# Patient Record
Sex: FEMALE | Race: WHITE | NOT HISPANIC OR LATINO | Employment: FULL TIME | URBAN - NONMETROPOLITAN AREA
[De-identification: names, ages, dates, MRNs, and addresses within clinical notes are randomized per-mention and may not be internally consistent; named-entity substitution may affect disease eponyms.]

---

## 2018-01-20 RX ORDER — MECLIZINE HYDROCHLORIDE 25 MG/1
25 TABLET ORAL 3 TIMES DAILY PRN
Qty: 30 TABLET | Refills: 2 | Status: SHIPPED | OUTPATIENT
Start: 2018-01-20 | End: 2018-05-08

## 2018-05-08 ENCOUNTER — OFFICE VISIT (OUTPATIENT)
Dept: OBSTETRICS AND GYNECOLOGY | Facility: CLINIC | Age: 48
End: 2018-05-08

## 2018-05-08 VITALS
HEIGHT: 66 IN | BODY MASS INDEX: 43.07 KG/M2 | SYSTOLIC BLOOD PRESSURE: 149 MMHG | WEIGHT: 268 LBS | DIASTOLIC BLOOD PRESSURE: 80 MMHG

## 2018-05-08 DIAGNOSIS — E66.01 MORBID OBESITY WITH BMI OF 40.0-44.9, ADULT (HCC): Chronic | ICD-10-CM

## 2018-05-08 DIAGNOSIS — R53.83 LETHARGY: ICD-10-CM

## 2018-05-08 DIAGNOSIS — R03.0 ELEVATED SYSTOLIC BLOOD PRESSURE READING WITHOUT DIAGNOSIS OF HYPERTENSION: ICD-10-CM

## 2018-05-08 DIAGNOSIS — F34.1 DYSTHYMIA: Chronic | ICD-10-CM

## 2018-05-08 DIAGNOSIS — N95.1 MENOPAUSAL SYNDROME: Primary | Chronic | ICD-10-CM

## 2018-05-08 PROCEDURE — 99203 OFFICE O/P NEW LOW 30 MIN: CPT | Performed by: OBSTETRICS & GYNECOLOGY

## 2018-05-08 RX ORDER — BUPROPION HYDROCHLORIDE 150 MG/1
150 TABLET, EXTENDED RELEASE ORAL 2 TIMES DAILY
Qty: 60 TABLET | Refills: 11 | Status: SHIPPED | OUTPATIENT
Start: 2018-05-08 | End: 2021-04-01

## 2018-05-08 RX ORDER — PHENTERMINE HYDROCHLORIDE 30 MG/1
30 CAPSULE ORAL EVERY MORNING
Qty: 60 CAPSULE | Refills: 3 | Status: SHIPPED | OUTPATIENT
Start: 2018-05-08 | End: 2021-04-01

## 2018-05-09 NOTE — PROGRESS NOTES
Jaja Land is a 48 y.o. y/o female     Chief Complaint: Lethargy, dysthymia, and weight gain    HPI: 48-year-old  0022 prior vaginal deliveries and hysterectomy with BSO in .    She underwent total abdominal hysterectomy and bilateral salpingo-oophorectomy around  for symptomatic fibroid uterus.  She used an estradiol patch for a short period of time, but is no longer using any estrogen replacement therapy and is no longer have any hot flashes.    Past medical history significant for obesity.    Past surgical history includes KARLA/BSO for fibroid uterus in .    OB history one vaginal delivery at 36 weeks and 2 days and second vaginal delivery at 40 weeks.    She is not currently taking any medications.    No known drug allergies.    She is .    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    She does not smoke or use smokeless tobacco.  She quit smoking in .    Her mother  at age 65 of a glioblastoma.  Her father is age 76.  She has siblings ages 51, 53, and 55.  She has 2 healthy children ages 22 and 23.  There is no family history of breast cancer, uterine cancer, ovarian cancer, or colon cancer.    Several years ago she took phentermine and had some success with weight loss but she quit taking it.  She checks her blood pressure with a large blood pressure cuff and that is been normal.  We discussed checking some fasting lab work and trying Wellbutrin  mg twice a day.  She is interested in trying this.  Also prescribed her phentermine 30 mg capsule to start in a couple weeks if she needs extra help with appetite suppression.  We discussed potential side effects with both the Wellbutrin and phentermine.  She does not have any chest pain or heart palpitations.    Review of Systems   Constitutional: Positive for fatigue. Negative for activity change, appetite change, chills, diaphoresis, fever and unexpected weight change.   Gastrointestinal: Negative for  abdominal pain, constipation, diarrhea and nausea.   Genitourinary: Negative for difficulty urinating, dyspareunia, dysuria, pelvic pain, urgency, vaginal bleeding, vaginal discharge and vaginal pain.   Neurological: Negative for headaches.   Psychiatric/Behavioral: Positive for dysphoric mood. The patient is not nervous/anxious.    All other systems reviewed and are negative.     Breast ROS: negative    The following portions of the patient's history were reviewed and updated as appropriate: allergies, current medications, past family history, past medical history, past social history, past surgical history and problem list.    No Known Allergies       Current Outpatient Prescriptions:   •  buPROPion SR (WELLBUTRIN SR) 150 MG 12 hr tablet, Take 1 tablet by mouth 2 (Two) Times a Day., Disp: 60 tablet, Rfl: 11  •  phentermine 30 MG capsule, Take 1 capsule by mouth Every Morning., Disp: 60 capsule, Rfl: 3     The patient has a family history of   Family History   Problem Relation Age of Onset   • Brain cancer Mother         Past Medical History:   Diagnosis Date   • Dysthymia 2018   • Elevated systolic blood pressure reading without diagnosis of hypertension 2018   • Fibroid    • Menopausal syndrome 2018   • Morbid obesity with BMI of 40.0-44.9, adult 2018        OB History      Para Term  AB Living    2 2 1 1      SAB TAB Ectopic Molar Multiple Live Births                    Social History     Social History   • Marital status:      Spouse name: N/A   • Number of children: N/A   • Years of education: N/A     Occupational History   • Not on file.     Social History Main Topics   • Smoking status: Former Smoker   • Smokeless tobacco: Never Used   • Alcohol use No   • Drug use: No   • Sexual activity: Not on file     Other Topics Concern   • Not on file     Social History Narrative   • No narrative on file        Past Surgical History:   Procedure Laterality Date   • HYSTERECTOMY    "  • WISDOM TOOTH EXTRACTION          Patient Active Problem List   Diagnosis   • Morbid obesity with BMI of 40.0-44.9, adult   • Menopausal syndrome   • Elevated systolic blood pressure reading without diagnosis of hypertension   • Dysthymia        Documented Vitals    05/08/18 1330   BP: 149/80   Weight: 122 kg (268 lb)   Height: 167.6 cm (66\")       Physical Exam   Constitutional: She is oriented to person, place, and time. No distress.   Obese white female weighing 268 pounds with BMI 43.3.  Blood pressure 149/80.  Pulse 76.     HENT:   Head: Normocephalic and atraumatic.   Eyes: Conjunctivae and EOM are normal. Pupils are equal, round, and reactive to light.   Neck: Normal range of motion. Neck supple. No JVD present. No tracheal deviation present. No thyromegaly present.   Cardiovascular: Normal rate, regular rhythm, normal heart sounds and intact distal pulses.  Exam reveals no gallop and no friction rub.    No murmur heard.  Pulmonary/Chest: Effort normal and breath sounds normal. No stridor. No respiratory distress. She has no wheezes. She has no rales. She exhibits no tenderness.   Abdominal: Soft. Bowel sounds are normal. She exhibits no distension and no mass. There is no tenderness. There is no rebound and no guarding. No hernia.   Musculoskeletal: Normal range of motion. She exhibits no edema, tenderness or deformity.   Lymphadenopathy:     She has no cervical adenopathy.   Neurological: She is alert and oriented to person, place, and time. She has normal reflexes. She displays normal reflexes. No cranial nerve deficit. She exhibits normal muscle tone. Coordination normal.   Skin: Skin is warm and dry. No rash noted. She is not diaphoretic. No erythema. No pallor.   Psychiatric: She has a normal mood and affect. Her behavior is normal. Judgment and thought content normal.   Nursing note and vitals reviewed.       Assessment        Diagnosis Plan   1. Menopausal syndrome     2. Dysthymia     3. Elevated " systolic blood pressure reading without diagnosis of hypertension     4. Morbid obesity with BMI of 40.0-44.9, adult     5. Lethargy           Plan      1. Check fasting lab work.  2. Wellbutrin  mg by mouth twice a day.  3. Phentermine 30 mg capsule each morning.  4. Encouraged in diet and exercise.  5. Handouts on depression, hot flashes, exercise, and vitamin use.   6. Follow-up in 6 weeks.  Follow-up sooner as needed.            This document has been electronically signed by Brandt Dacosta MD on May 8, 2018 8:02 PM

## 2019-03-22 RX ORDER — FLUCONAZOLE 150 MG/1
TABLET ORAL
Qty: 2 TABLET | Refills: 12 | Status: SHIPPED | OUTPATIENT
Start: 2019-03-22 | End: 2021-04-01

## 2019-03-22 RX ORDER — AMOXICILLIN 500 MG/1
500 CAPSULE ORAL 3 TIMES DAILY
Qty: 30 CAPSULE | Refills: 0 | Status: SHIPPED | OUTPATIENT
Start: 2019-03-22 | End: 2021-04-01

## 2020-12-21 ENCOUNTER — IMMUNIZATION (OUTPATIENT)
Dept: VACCINE CLINIC | Facility: HOSPITAL | Age: 50
End: 2020-12-21

## 2020-12-21 PROCEDURE — 91300 HC SARSCOV02 VAC 30MCG/0.3ML IM: CPT | Performed by: THORACIC SURGERY (CARDIOTHORACIC VASCULAR SURGERY)

## 2020-12-21 PROCEDURE — 0001A: CPT | Performed by: THORACIC SURGERY (CARDIOTHORACIC VASCULAR SURGERY)

## 2021-01-11 ENCOUNTER — IMMUNIZATION (OUTPATIENT)
Dept: VACCINE CLINIC | Facility: HOSPITAL | Age: 51
End: 2021-01-11

## 2021-01-11 PROCEDURE — 0002A: CPT | Performed by: THORACIC SURGERY (CARDIOTHORACIC VASCULAR SURGERY)

## 2021-01-11 PROCEDURE — 0001A: CPT | Performed by: THORACIC SURGERY (CARDIOTHORACIC VASCULAR SURGERY)

## 2021-01-11 PROCEDURE — 91300 HC SARSCOV02 VAC 30MCG/0.3ML IM: CPT | Performed by: THORACIC SURGERY (CARDIOTHORACIC VASCULAR SURGERY)

## 2021-03-29 ENCOUNTER — TRANSCRIBE ORDERS (OUTPATIENT)
Dept: PODIATRY | Facility: CLINIC | Age: 51
End: 2021-03-29

## 2021-03-29 DIAGNOSIS — M79.671 RIGHT FOOT PAIN: Primary | ICD-10-CM

## 2021-03-30 ENCOUNTER — OFFICE VISIT (OUTPATIENT)
Dept: PODIATRY | Facility: CLINIC | Age: 51
End: 2021-03-30

## 2021-03-30 VITALS — WEIGHT: 260 LBS | HEIGHT: 66 IN | BODY MASS INDEX: 41.78 KG/M2 | HEART RATE: 90 BPM | OXYGEN SATURATION: 99 %

## 2021-03-30 DIAGNOSIS — S96.911A OVERUSE SYNDROME OF FOOT, RIGHT, INITIAL ENCOUNTER: ICD-10-CM

## 2021-03-30 DIAGNOSIS — M79.671 RIGHT FOOT PAIN: Primary | ICD-10-CM
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
## 2021-03-30 DIAGNOSIS — Q66.221 METATARSUS ADDUCTUS OF RIGHT FOOT: ICD-10-CM

## 2021-03-30 DIAGNOSIS — M77.41 METATARSALGIA OF RIGHT FOOT: ICD-10-CM

## 2021-03-30 PROCEDURE — 99203 OFFICE O/P NEW LOW 30 MIN: CPT | Performed by: PODIATRIST

## 2021-03-30 RX ORDER — MELOXICAM 15 MG/1
15 TABLET ORAL DAILY
Qty: 30 TABLET | Refills: 0 | Status: SHIPPED | OUTPATIENT
Start: 2021-03-30 | End: 2022-07-22

## 2021-03-30 NOTE — PROGRESS NOTES
Jaja Land  1970  50 y.o. female     Patient presents to clinic today with the complain of right foot pain. Xray obtained today.     03/30/2021  Chief Complaint   Patient presents with   • Right Foot - Pain           History of Present Illness    Jaja Land is a 50 y.o. female who presents for evaluation of right foot pain.  Patient states that this is a chronic issue for her which is been worsening significantly over the past couple months.  She describes her pain as achy at times throbbing in sharp.  States pain is primarily located towards the outside of her foot.  Pain is worse with activity, working and hiking.  Pain is somewhat relieved with rest.  She denies any known trauma or injuries.  She has changed her shoe gear and taken NSAIDs with no significant improvement of her symptoms.      Past Medical History:   Diagnosis Date   • Dysthymia 5/8/2018   • Elevated systolic blood pressure reading without diagnosis of hypertension 5/8/2018   • Fibroid    • Menopausal syndrome 5/8/2018   • Morbid obesity with BMI of 40.0-44.9, adult (CMS/[facility]) 5/8/2018   • Plantar fasciitis          Past Surgical History:   Procedure Laterality Date   • HYSTERECTOMY     • WISDOM TOOTH EXTRACTION           Family History   Problem Relation Age of Onset   • Brain cancer Mother    • Cancer Mother    • Thyroid disease Mother    • Thyroid disease Sister          Social History     Socioeconomic History   • Marital status:      Spouse name: Not on file   • Number of children: Not on file   • Years of education: Not on file   • Highest education level: Not on file   Tobacco Use   • Smoking status: Former Smoker   • Smokeless tobacco: Never Used   Vaping Use   • Vaping Use: Never used   Substance and Sexual Activity   • Alcohol use: No   • Drug use: No         Current Outpatient Medications   Medication Sig Dispense Refill   • naltrexone-bupropion ER (Contrave) 8-90 MG tablet Take 1 tablet by mouth daily. 90 tablet 2  "  • amoxicillin (AMOXIL) 500 MG capsule Take 1 capsule by mouth 3 (Three) Times a Day. 30 capsule 0   • buPROPion SR (WELLBUTRIN SR) 150 MG 12 hr tablet Take 1 tablet by mouth 2 (Two) Times a Day. 60 tablet 11   • buPROPion XL (WELLBUTRIN XL) 150 MG 24 hr tablet Take 1 tablet by mouth Daily. 30 tablet 2   • fluconazole (DIFLUCAN) 150 MG tablet Take one po today and take one po in 4 days. 2 tablet 12   • meloxicam (MOBIC) 15 MG tablet Take 1 tablet by mouth Daily. **Take with food** 30 tablet 0   • naltrexone-bupropion ER (Contrave) 8-90 MG tablet Take one-half tablet by mouth daily for 1 week, then 1 tablet daily for 1 week, then 1 tablet twice daily thereafter. 43 tablet 0   • naltrexone-bupropion ER (Contrave) 8-90 MG tablet Take 1 tablet by mouth daily. 30 tablet 0   • phentermine 30 MG capsule Take 1 capsule by mouth Every Morning. 60 capsule 3   • topiramate (TOPAMAX) 100 MG tablet Take 1 tablet by mouth Every Evening. 30 tablet 2   • topiramate (TOPAMAX) 25 MG tablet Take 1 tablet by mouth once daily x 1 week, then 2 tabs a day x 1 week, then 3 tabs a day x 1 week, then 4 tabs a day. 120 tablet 0     No current facility-administered medications for this visit.         OBJECTIVE    Pulse 90   Ht 167.6 cm (66\")   Wt 118 kg (260 lb)   SpO2 99%   BMI 41.97 kg/m²       Review of Systems   Constitutional: Negative.    HENT: Negative.    Eyes: Negative.    Respiratory: Negative.    Cardiovascular: Negative.    Gastrointestinal: Negative.    Endocrine: Negative.    Genitourinary: Negative.    Musculoskeletal:        Foot pain.    Skin: Negative.    Allergic/Immunologic: Negative.    Neurological: Negative.    Hematological: Negative.    Psychiatric/Behavioral: Negative.  Negative for agitation.         Physical Exam   Constitutional: she appears well-developed and well-nourished.   HEENT: Normocephalic. Atraumatic.  CV: No CP. RRR  Resp: Non-labored respirations.  Psychiatric: she has a normal mood and affect. her " behavior is normal.         Lower Extremity Exam:  Vascular: DP/PT pulses palpable 2+.   No edema, right foot  Foot warm  CFT wnl  Neuro: Protective sensation intact, b/l.  DTRs intact  Negative Tinel over dorsal midfoot, b/l.  Integument: No open wounds or lesions.  No erythema, scaling  No masses  Musculoskeletal: LE muscle strength 5/5.   Gait normal  Ankle ROM full without pain or crepitus. Minimal gastrocnemius Equinus  STJ ROM full without pain or crepitus  Tenderness on palpation of dorsal midfoot, over fourth and fifth tarsometatarsal joints  +osseous prominence to dorsal midfoot, right  No gross instability or crepitus  Mild pes planus              ASSESSMENT AND PLAN    Diagnoses and all orders for this visit:    1. Right foot pain (Primary)  -     XR Foot 3+ View Right    2. Overuse syndrome of foot, right, initial encounter    3. Metatarsalgia of right foot    4. Metatarsus adductus of right foot    Other orders  -     meloxicam (MOBIC) 15 MG tablet; Take 1 tablet by mouth Daily. **Take with food**  Dispense: 30 tablet; Refill: 0      -Comprehensive foot and ankle exam  -Radiographs ordered reviewed  -Patient does have very mild underlying skew foot deformity with metatarsus adductus.  Symptoms seem to be consistent with lateral column overload.  -Discussed supportive shoes, limit barefoot walking.  Patient does do pretty a job with her shoe gear.  -Recommend increased arch support with power step over-the-counter insoles  -Discussed local corticosteroid injection however will need to obtain prior authorization.  -Rx meloxicam 15 mg daily not to be taken with other NSAIDs  -Recheck 4 weeks        This document has been electronically signed by Blue Del Rosario DPM on March 31, 2021 08:47 CDT       Much of this encounter note is an electronic transcription/translation of spoken language to printed text.   Blue Del Rosario DPM  3/31/2021  08:47 CDT

## 2021-04-01 ENCOUNTER — OFFICE VISIT (OUTPATIENT)
Dept: PODIATRY | Facility: CLINIC | Age: 51
End: 2021-04-01

## 2021-04-01 VITALS — WEIGHT: 260 LBS | BODY MASS INDEX: 41.78 KG/M2 | OXYGEN SATURATION: 98 % | HEART RATE: 73 BPM | HEIGHT: 66 IN

## 2021-04-01 DIAGNOSIS — Q66.221 METATARSUS ADDUCTUS OF RIGHT FOOT: Primary | ICD-10-CM

## 2021-04-01 DIAGNOSIS — S96.911A OVERUSE SYNDROME OF FOOT, RIGHT, INITIAL ENCOUNTER: ICD-10-CM

## 2021-04-01 DIAGNOSIS — M79.671 RIGHT FOOT PAIN: ICD-10-CM
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
## 2021-04-01 PROCEDURE — 20600 DRAIN/INJ JOINT/BURSA W/O US: CPT | Performed by: PODIATRIST

## 2021-04-01 RX ORDER — DEXAMETHASONE SODIUM PHOSPHATE 10 MG/ML
10 INJECTION INTRAMUSCULAR; INTRAVENOUS ONCE
Status: COMPLETED | OUTPATIENT
Start: 2021-04-01 | End: 2021-04-01

## 2021-04-01 RX ORDER — TRIAMCINOLONE ACETONIDE 40 MG/ML
10 INJECTION, SUSPENSION INTRA-ARTICULAR; INTRAMUSCULAR ONCE
Status: COMPLETED | OUTPATIENT
Start: 2021-04-01 | End: 2021-04-01

## 2021-04-01 RX ADMIN — DEXAMETHASONE SODIUM PHOSPHATE 10 MG: 10 INJECTION INTRAMUSCULAR; INTRAVENOUS at 08:52

## 2021-04-01 RX ADMIN — TRIAMCINOLONE ACETONIDE 10 MG: 40 INJECTION, SUSPENSION INTRA-ARTICULAR; INTRAMUSCULAR at 08:52

## 2021-04-01 NOTE — PROGRESS NOTES
"Jaja Land  1970  50 y.o. female     Patient presents to clinic today for a midfoot injection.      04/01/2021    Chief Complaint   Patient presents with   • Right Foot - Injections           History of Present Illness    Jaja Land is a 50 y.o. female who presents for right foot injection.    Past Medical History:   Diagnosis Date   • Dysthymia 5/8/2018   • Elevated systolic blood pressure reading without diagnosis of hypertension 5/8/2018   • Fibroid    • Menopausal syndrome 5/8/2018   • Morbid obesity with BMI of 40.0-44.9, adult (CMS/[facility]) 5/8/2018   • Plantar fasciitis          Past Surgical History:   Procedure Laterality Date   • HYSTERECTOMY     • WISDOM TOOTH EXTRACTION           Family History   Problem Relation Age of Onset   • Brain cancer Mother    • Cancer Mother    • Thyroid disease Mother    • Thyroid disease Sister          Social History     Socioeconomic History   • Marital status:      Spouse name: Not on file   • Number of children: Not on file   • Years of education: Not on file   • Highest education level: Not on file   Tobacco Use   • Smoking status: Former Smoker   • Smokeless tobacco: Never Used   Vaping Use   • Vaping Use: Never used   Substance and Sexual Activity   • Alcohol use: No   • Drug use: No         Current Outpatient Medications   Medication Sig Dispense Refill   • meloxicam (MOBIC) 15 MG tablet Take 1 tablet by mouth Daily. **Take with food** 30 tablet 0   • naltrexone-bupropion ER (Contrave) 8-90 MG tablet Take one-half tablet by mouth daily for 1 week, then 1 tablet daily for 1 week, then 1 tablet twice daily thereafter. 43 tablet 0     No current facility-administered medications for this visit.         OBJECTIVE    Pulse 73   Ht 167.6 cm (66\")   Wt 118 kg (260 lb)   SpO2 98%   BMI 41.97 kg/m²       Review of Systems   Constitutional: Negative.    HENT: Negative.    Eyes: Negative.    Respiratory: Negative.    Cardiovascular: Negative.  "   Gastrointestinal: Negative.    Endocrine: Negative.    Genitourinary: Negative.    Musculoskeletal:        Foot pain.    Skin: Negative.    Allergic/Immunologic: Negative.    Neurological: Negative.    Hematological: Negative.    Psychiatric/Behavioral: Negative.  Negative for agitation.         Physical Exam   Constitutional: she appears well-developed and well-nourished.   HEENT: Normocephalic. Atraumatic.  CV: No CP. RRR  Resp: Non-labored respirations.  Psychiatric: she has a normal mood and affect. her behavior is normal.         Lower Extremity Exam:  Vascular: DP/PT pulses palpable 2+.   No edema, right foot  Foot warm  CFT wnl  Neuro: Protective sensation intact, b/l.  DTRs intact  Negative Tinel over dorsal midfoot, b/l.  Integument: No open wounds or lesions.  No erythema, scaling  No masses  Musculoskeletal: LE muscle strength 5/5.   Gait normal  Ankle ROM full without pain or crepitus. Minimal gastrocnemius Equinus  STJ ROM full without pain or crepitus  Mild tenderness on palpation of dorsal midfoot, over fourth and fifth tarsometatarsal joints  +osseous prominence to dorsal midfoot, right  No gross instability or crepitus  Mild pes planus      Small joint injection:  Risks and benefits discussed. Written consent obtained.  Skin prepped with alcohol  Dorsal fourth tarsometatarsal joint injection performed with 0.5cc 0.5% Marcaine, 1 cc Decadron, 1 cc Kenalog 40 with 27g needle  Band aid applied. Patient tolerated well.          ASSESSMENT AND PLAN    Diagnoses and all orders for this visit:    1. Metatarsus adductus of right foot (Primary)  -     triamcinolone acetonide (KENALOG-40) injection 10 mg  -     dexamethasone (DECADRON) injection 10 mg    2. Overuse syndrome of foot, right, initial encounter    3. Right foot pain      -Corticosteroid injection as above  -Tinea supportive shoe gear, power step Max insoles.  Meloxicam 15 mg daily  -Recheck as needed        This document has been electronically  signed by Blue Del Rosario DPM on April 1, 2021 14:57 CDT       Much of this encounter note is an electronic transcription/translation of spoken language to printed text.   Blue Del Rosario DPM  4/1/2021  14:57 CDT

## 2021-04-05 ENCOUNTER — TELEPHONE (OUTPATIENT)
Dept: PODIATRY | Facility: CLINIC | Age: 51
End: 2021-04-05

## 2021-04-05 ENCOUNTER — PATIENT MESSAGE (OUTPATIENT)
Dept: PODIATRY | Facility: CLINIC | Age: 51
End: 2021-04-05

## 2021-04-05 NOTE — TELEPHONE ENCOUNTER
----- Message from Jaja Land sent at 4/5/2021  2:14 AM CDT -----  Regarding: Prescription Question  Contact: [phone]  I took the mobic and had to stop after 3 days due to an unrelenting headache.  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   I received the cortisone shot on Thursday and I wear the inserts.  My question is,  is this as good as it gets?  Will I continue to have pain towards the end of my 12 hr shifts?  Is there anything else that I can do to minimize further pain and progression of the skewing?  Thank you.

## 2021-04-06 NOTE — TELEPHONE ENCOUNTER
Spoke with patient and per Dr. Del Rosario the skewing of her foot is done. She might still have pain at the end of a 12hr shift but she is only 4 days out from having her injections and this should not be her maximum benefit from them. Advised patient to take ibuprofen since she can not take mobic.

## 2021-06-25 ENCOUNTER — OFFICE VISIT (OUTPATIENT)
Dept: PODIATRY | Facility: CLINIC | Age: 51
End: 2021-06-25

## 2021-06-25 VITALS
OXYGEN SATURATION: 98 % | HEIGHT: 66 IN | HEART RATE: 81 BPM | WEIGHT: 260 LBS | SYSTOLIC BLOOD PRESSURE: 140 MMHG | BODY MASS INDEX: 41.78 KG/M2 | DIASTOLIC BLOOD PRESSURE: 81 MMHG

## 2021-06-25 DIAGNOSIS — M79.671 RIGHT FOOT PAIN: ICD-10-CM

## 2021-06-25 DIAGNOSIS — S96.911A OVERUSE SYNDROME OF FOOT, RIGHT, INITIAL ENCOUNTER: ICD-10-CM
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
## 2021-06-25 DIAGNOSIS — M77.41 METATARSALGIA OF RIGHT FOOT: ICD-10-CM

## 2021-06-25 DIAGNOSIS — Q66.221 METATARSUS ADDUCTUS OF RIGHT FOOT: Primary | ICD-10-CM

## 2021-06-25 PROCEDURE — 99213 OFFICE O/P EST LOW 20 MIN: CPT | Performed by: PODIATRIST

## 2021-06-25 RX ORDER — NALTREXONE HYDROCHLORIDE AND BUPROPION HYDROCHLORIDE 8; 90 MG/1; MG/1
1 TABLET, EXTENDED RELEASE ORAL DAILY
COMMUNITY
Start: 2021-02-25 | End: 2021-06-25

## 2021-06-25 NOTE — PROGRESS NOTES
Jaja Land  1970  51 y.o. female     Patient returns to clinic for concern of right foot pain. Xray obtained on 3/30/2021  06/25/2021      Chief Complaint   Patient presents with   • Right Foot - Pain           History of Present Illness    Jaja Land is a 51 y.o. female who presents for follow-up of right lateral foot pain.  Previously treated with over-the-counter insoles, p.o. meloxicam and corticosteroid injection.  States she is not note a significant improvement with the orthotics.  Was only able to take meloxicam for 3 days due to onset of a significant headache.  States that the injection helped significantly however wore off after 6 to 8 weeks.  Pain is overall unchanged from previous visit.    Past Medical History:   Diagnosis Date   • Dysthymia 5/8/2018   • Elevated systolic blood pressure reading without diagnosis of hypertension 5/8/2018   • Fibroid    • Menopausal syndrome 5/8/2018   • Morbid obesity with BMI of 40.0-44.9, adult (CMS/[facility]) 5/8/2018   • Plantar fasciitis          Past Surgical History:   Procedure Laterality Date   • HYSTERECTOMY     • WISDOM TOOTH EXTRACTION           Family History   Problem Relation Age of Onset   • Brain cancer Mother    • Cancer Mother    • Thyroid disease Mother    • Thyroid disease Sister          Social History     Socioeconomic History   • Marital status:      Spouse name: Not on file   • Number of children: Not on file   • Years of education: Not on file   • Highest education level: Not on file   Tobacco Use   • Smoking status: Former Smoker   • Smokeless tobacco: Never Used   Vaping Use   • Vaping Use: Never used   Substance and Sexual Activity   • Alcohol use: No   • Drug use: No         Current Outpatient Medications   Medication Sig Dispense Refill   • naltrexone-bupropion ER (Contrave) 8-90 MG tablet Take one-half tablet by mouth daily for 1 week, then 1 tablet daily for 1 week, then 1 tablet twice daily thereafter. 43 tablet 0   •  "meloxicam (MOBIC) 15 MG tablet Take 1 tablet by mouth Daily. **Take with food** 30 tablet 0     No current facility-administered medications for this visit.         OBJECTIVE    /81   Pulse 81   Ht 167.6 cm (66\")   Wt 118 kg (260 lb)   SpO2 98%   BMI 41.97 kg/m²       Review of Systems   Constitutional: Negative.    HENT: Negative.    Eyes: Negative.    Respiratory: Negative.    Cardiovascular: Negative.    Gastrointestinal: Negative.    Endocrine: Negative.    Genitourinary: Negative.    Musculoskeletal:        Foot pain.    Skin: Negative.    Allergic/Immunologic: Negative.    Neurological: Negative.    Hematological: Negative.    Psychiatric/Behavioral: Negative.  Negative for agitation.         Physical Exam   Constitutional: she appears well-developed and well-nourished.   HEENT: Normocephalic. Atraumatic.  CV: No CP. RRR  Resp: Non-labored respirations.  Psychiatric: she has a normal mood and affect. her behavior is normal.         Lower Extremity Exam:  Vascular: DP/PT pulses palpable 2+.   No edema, right foot  Foot warm  CFT wnl  Neuro: Protective sensation intact, b/l.  DTRs intact  Negative Tinel over dorsal midfoot, b/l.  Integument: No open wounds or lesions.  No erythema, scaling  No masses  Musculoskeletal: LE muscle strength 5/5.   Gait normal  Ankle ROM full without pain or crepitus. Minimal gastrocnemius Equinus  STJ ROM full without pain or crepitus  Sharp tenderness on palpation of dorsal midfoot, over fourth and fifth tarsometatarsal joints, calcaneocuboid joint  +osseous prominence to dorsal midfoot, right  No gross instability or crepitus  Mild pes planus        ASSESSMENT AND PLAN    Diagnoses and all orders for this visit:    1. Metatarsus adductus of right foot (Primary)  -     MRI Foot Right Without Contrast; Future    2. Overuse syndrome of foot, right, initial encounter  -     MRI Foot Right Without Contrast; Future    3. Right foot pain  -     MRI Foot Right Without Contrast; " Future    4. Metatarsalgia of right foot  -     MRI Foot Right Without Contrast; Future      -Comprehensive foot and ankle exam  -Radiographs once again reviewed patient does have underlying metatarsus adductus and mild skew foot.  Patient does also self report remote ankle injury and instability.  -Due to chronicity of symptoms and lack of long-term improvement I did recommend MRI for further work-up.  -Consider custom orthotics in future as indicated  -Recheck following MRI        This document has been electronically signed by Blue Del Rosario DPM on June 25, 2021 13:51 CDT       Much of this encounter note is an electronic transcription/translation of spoken language to printed text.   Blue Del Rosario DPM  6/25/2021  13:51 CDT

## 2021-07-01 ENCOUNTER — HOSPITAL ENCOUNTER (OUTPATIENT)
Dept: MRI IMAGING | Facility: HOSPITAL | Age: 51
Discharge: HOME | End: 2021-07-01
Admitting: PODIATRIST

## 2021-07-01 DIAGNOSIS — M77.41 METATARSALGIA OF RIGHT FOOT: ICD-10-CM

## 2021-07-01 DIAGNOSIS — Q66.221 METATARSUS ADDUCTUS OF RIGHT FOOT: ICD-10-CM
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
## 2021-07-01 DIAGNOSIS — M79.671 RIGHT FOOT PAIN: ICD-10-CM

## 2021-07-01 DIAGNOSIS — S96.911A OVERUSE SYNDROME OF FOOT, RIGHT, INITIAL ENCOUNTER: ICD-10-CM

## 2021-07-01 PROCEDURE — 73718 MRI LOWER EXTREMITY W/O DYE: CPT

## 2021-07-13 ENCOUNTER — LAB (OUTPATIENT)
Dept: LAB | Facility: HOSPITAL | Age: 51
End: 2021-07-13

## 2021-07-13 ENCOUNTER — OFFICE VISIT (OUTPATIENT)
Dept: PODIATRY | Facility: CLINIC | Age: 51
End: 2021-07-13

## 2021-07-13 VITALS
BODY MASS INDEX: 41.78 KG/M2 | SYSTOLIC BLOOD PRESSURE: 118 MMHG | HEIGHT: 66 IN | OXYGEN SATURATION: 96 % | WEIGHT: 260 LBS | HEART RATE: 73 BPM | DIASTOLIC BLOOD PRESSURE: 74 MMHG

## 2021-07-13 DIAGNOSIS — M84.374A STRESS FRACTURE OF RIGHT FOOT, INITIAL ENCOUNTER: ICD-10-CM

## 2021-07-13 DIAGNOSIS — S96.911A OVERUSE SYNDROME OF FOOT, RIGHT, INITIAL ENCOUNTER: ICD-10-CM
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
## 2021-07-13 DIAGNOSIS — Q66.221 METATARSUS ADDUCTUS OF RIGHT FOOT: ICD-10-CM

## 2021-07-13 DIAGNOSIS — M84.374A STRESS FRACTURE OF RIGHT FOOT, INITIAL ENCOUNTER: Primary | ICD-10-CM

## 2021-07-13 PROCEDURE — 82306 VITAMIN D 25 HYDROXY: CPT

## 2021-07-13 PROCEDURE — 36415 COLL VENOUS BLD VENIPUNCTURE: CPT

## 2021-07-13 PROCEDURE — 99213 OFFICE O/P EST LOW 20 MIN: CPT | Performed by: PODIATRIST

## 2021-07-13 NOTE — PROGRESS NOTES
Jaja Land  1970  51 y.o. female     Patient presents to clinic today for MRI results on right foot.   07/13/2021    Chief Complaint   Patient presents with   • Right Foot - Follow-up, MRI results           History of Present Illness    Jaja Land is a 51 y.o. female who presents for follow-up of right lateral foot pain.  Previously treated with over-the-counter insoles, p.o. meloxicam and corticosteroid injection.  Her pain has mildly improved but still present on daily basis.  Has had MRI since last visit.    Past Medical History:   Diagnosis Date   • Dysthymia 5/8/2018   • Elevated systolic blood pressure reading without diagnosis of hypertension 5/8/2018   • Fibroid    • Menopausal syndrome 5/8/2018   • Morbid obesity with BMI of 40.0-44.9, adult (CMS/[facility]) 5/8/2018   • Plantar fasciitis          Past Surgical History:   Procedure Laterality Date   • HYSTERECTOMY     • WISDOM TOOTH EXTRACTION           Family History   Problem Relation Age of Onset   • Brain cancer Mother    • Cancer Mother    • Thyroid disease Mother    • Thyroid disease Sister          Social History     Socioeconomic History   • Marital status:      Spouse name: Not on file   • Number of children: Not on file   • Years of education: Not on file   • Highest education level: Not on file   Tobacco Use   • Smoking status: Former Smoker   • Smokeless tobacco: Never Used   Vaping Use   • Vaping Use: Never used   Substance and Sexual Activity   • Alcohol use: No   • Drug use: No         Current Outpatient Medications   Medication Sig Dispense Refill   • meloxicam (MOBIC) 15 MG tablet Take 1 tablet by mouth Daily. **Take with food** 30 tablet 0   • naltrexone-bupropion ER (Contrave) 8-90 MG tablet Take one-half tablet by mouth daily for 1 week, then 1 tablet daily for 1 week, then 1 tablet twice daily thereafter. 43 tablet 0   • vitamin D (ERGOCALCIFEROL) 1.25 MG (99464 UT) capsule capsule Take 1 capsule by mouth 1 (One) Time Per  "Week. 8 capsule 0     No current facility-administered medications for this visit.         OBJECTIVE    /74   Pulse 73   Ht 167.6 cm (66\")   Wt 118 kg (260 lb)   SpO2 96%   BMI 41.97 kg/m²       Review of Systems   Constitutional: Negative.    HENT: Negative.    Eyes: Negative.    Respiratory: Negative.    Cardiovascular: Negative.    Gastrointestinal: Negative.    Endocrine: Negative.    Genitourinary: Negative.    Musculoskeletal:        Foot pain.    Skin: Negative.    Allergic/Immunologic: Negative.    Neurological: Negative.    Hematological: Negative.    Psychiatric/Behavioral: Negative.  Negative for agitation.         Physical Exam   Constitutional: she appears well-developed and well-nourished.   HEENT: Normocephalic. Atraumatic.  CV: No CP. RRR  Resp: Non-labored respirations.  Psychiatric: she has a normal mood and affect. her behavior is normal.         Lower Extremity Exam:  Vascular: DP/PT pulses palpable 2+.   No edema, right foot  Foot warm  CFT wnl  Neuro: Protective sensation intact, b/l.  DTRs intact  Negative Tinel over dorsal midfoot, b/l.  Integument: No open wounds or lesions.  No erythema, scaling  No masses  Musculoskeletal: LE muscle strength 5/5.   Gait normal  Ankle ROM full without pain or crepitus. Minimal gastrocnemius Equinus  STJ ROM full without pain or crepitus  Sharp tenderness on palpation of dorsal midfoot, over cuboid body  +osseous prominence to dorsal midfoot, right  No gross instability or crepitus  Mild pes planus    MRI right foot.     HISTORY: Lateral right foot pain, overuse.     Prior exam: Right foot March 30 2021.     Technique: Multiplanar multisequence noncontrast images right  foot.     FINDINGS: Increased signal intensity, bone edema tarsal cuboid.  No discrete fracture lines observed. This may represent contusion  or reactive stress changes, overuse.     Normal peroneus longus and brevis tendons. Normal posterior  tibialis flexor hallucis and flexor " digitorum tendons.     Normal Achilles tendon.           IMPRESSION:  Isolated foci of increased signal intensity, edema  tarsal cuboid anterior aspect of uncertain significance. This may  represent contusion or reactive stress changes, overuse     MRI right foot is otherwise unremarkable.     Electronically signed by:  Zackary Herbert MD  7/1/2021 1:54 PM CDT  Workstation: [id number]    ASSESSMENT AND PLAN    Diagnoses and all orders for this visit:    1. Stress fracture of right foot, initial encounter (Primary)  -     Vitamin D 25 Hydroxy; Future    2. Metatarsus adductus of right foot    3. Overuse syndrome of foot, right, initial encounter    Other orders  -     vitamin D (ERGOCALCIFEROL) 1.25 MG (37999 UT) capsule capsule; Take 1 capsule by mouth 1 (One) Time Per Week.  Dispense: 8 capsule; Refill: 0      -Comprehensive foot and ankle exam  -MRI reviewed with patient.  Seems to have some bone contusion/possible stress fracture to cuboid body.  Due to prolonged symptoms I did recommend further offloading of this area by placing patient into a tall cam walker.  -We will check a vitamin D level and supplement as appropriate.  -Recheck 3 weeks        This document has been electronically signed by Blue Del Rosario DPM on July 14, 2021 13:15 CDT       Much of this encounter note is an electronic transcription/translation of spoken language to printed text.   Blue Del Rosario DPM  7/14/2021  13:15 CDT

## 2021-07-14 ENCOUNTER — TELEPHONE (OUTPATIENT)
Dept: PODIATRY | Facility: CLINIC | Age: 51
End: 2021-07-14

## 2021-07-14 LAB — 25(OH)D3 SERPL-MCNC: 15.6 NG/ML (ref 30–100)

## 2021-07-14 RX ORDER — ERGOCALCIFEROL 1.25 MG/1
50000 CAPSULE ORAL WEEKLY
Qty: 8 CAPSULE | Refills: 0 | Status: SHIPPED | OUTPATIENT
Start: 2021-07-14 | End: 2022-07-22

## 2021-07-14 NOTE — TELEPHONE ENCOUNTER
Spoke with patient and let her know that per Dr. Del Rosario her vitamin D level is low and he has sent in a script for her to her pharmacy.

## 2021-08-02 ENCOUNTER — TELEPHONE (OUTPATIENT)
Dept: PODIATRY | Facility: CLINIC | Age: 51
End: 2021-08-02

## 2021-08-02 NOTE — TELEPHONE ENCOUNTER
----- Message from Jaja Land sent at 7/31/2021 12:17 PM CDT -----  Regarding: Visit Follow-Up Question  Contact: [phone]  I was told to wear the CAM boot for 3 weeks.  Am I then to take it off and do a trial without it?  Tuesday will make 3 weeks.

## 2021-08-05 ENCOUNTER — TELEPHONE (OUTPATIENT)
Dept: PODIATRY | Facility: CLINIC | Age: 51
End: 2021-08-05

## 2021-08-05 NOTE — TELEPHONE ENCOUNTER
PT REQUESTS A CALL BACK RE PT RETURNED MARY CARMEN PHONE CALL.  CALL BACK # [phone].  THANK YOU.

## 2021-10-11 ENCOUNTER — IMMUNIZATION (OUTPATIENT)
Dept: VACCINE CLINIC | Facility: HOSPITAL | Age: 51
End: 2021-10-11

## 2021-10-11 PROCEDURE — 0003A: CPT | Performed by: SURGERY

## 2021-10-11 PROCEDURE — 0004A ADM SARSCOV2 30MCG/0.3ML BOOSTER: CPT | Performed by: SURGERY

## 2021-10-11 PROCEDURE — 91300 HC SARSCOV02 VAC 30MCG/0.3ML IM: CPT | Performed by: SURGERY

## 2022-06-26 RX ORDER — CLOTRIMAZOLE AND BETAMETHASONE DIPROPIONATE 10; .64 MG/G; MG/G
1 CREAM TOPICAL 2 TIMES DAILY
Qty: 45 G | Refills: 4 | Status: SHIPPED | OUTPATIENT
Start: 2022-06-26 | End: 2022-07-22

## 2022-07-22 ENCOUNTER — OFFICE VISIT (OUTPATIENT)
Dept: PODIATRY | Facility: CLINIC | Age: 52
End: 2022-07-22

## 2022-07-22 VITALS — HEART RATE: 83 BPM | OXYGEN SATURATION: 97 % | WEIGHT: 260 LBS | HEIGHT: 66 IN | BODY MASS INDEX: 41.78 KG/M2

## 2022-07-22 DIAGNOSIS — M72.2 PLANTAR FASCIITIS: ICD-10-CM

## 2022-07-22 DIAGNOSIS — M79.671 RIGHT FOOT PAIN: Primary | ICD-10-CM

## 2022-07-22 PROCEDURE — 99213 OFFICE O/P EST LOW 20 MIN: CPT | Performed by: PODIATRIST

## 2022-07-22 RX ORDER — METHYLPREDNISOLONE 4 MG/1
TABLET ORAL
Qty: 21 TABLET | Refills: 0 | Status: SHIPPED | OUTPATIENT
Start: 2022-07-22 | End: 2022-08-22

## 2022-07-22 RX ORDER — NABUMETONE 500 MG/1
500 TABLET, FILM COATED ORAL 2 TIMES DAILY
Qty: 60 TABLET | Refills: 0 | Status: SHIPPED | OUTPATIENT
Start: 2022-07-22 | End: 2022-08-22 | Stop reason: SDUPTHER

## 2022-07-22 NOTE — PROGRESS NOTES
"Jaja Land  1970  52 y.o. female     Patient presents to clinic today for pain in the right foot.     07/13/2021    Chief Complaint   Patient presents with   • Right Foot - Pain           History of Present Illness    Jaja Land is a 52 y.o. female who presents for evaluation new problem right heel pain has been ongoing for a few months.  Denies any trauma or injuries.  States this began while on a extended hiking trip.  Has been doing some stretching and taking ibuprofen without significant improvement.    Past Medical History:   Diagnosis Date   • Dysthymia 5/8/2018   • Elevated systolic blood pressure reading without diagnosis of hypertension 5/8/2018   • Fibroid    • Menopausal syndrome 5/8/2018   • Morbid obesity with BMI of 40.0-44.9, adult ([facility]) 5/8/2018   • Plantar fasciitis          Past Surgical History:   Procedure Laterality Date   • HYSTERECTOMY     • WISDOM TOOTH EXTRACTION           Family History   Problem Relation Age of Onset   • Brain cancer Mother    • Cancer Mother    • Thyroid disease Mother    • Thyroid disease Sister          Social History     Socioeconomic History   • Marital status:    Tobacco Use   • Smoking status: Former Smoker   • Smokeless tobacco: Never Used   Vaping Use   • Vaping Use: Never used   Substance and Sexual Activity   • Alcohol use: No   • Drug use: No         Current Outpatient Medications   Medication Sig Dispense Refill   • methylPREDNISolone (MEDROL) 4 MG dose pack Take as directed on package.  **Take with food** 21 tablet 0   • nabumetone (Relafen) 500 MG tablet Take 1 tablet by mouth 2 (Two) Times a Day.  **Take with food** 60 tablet 0     No current facility-administered medications for this visit.         OBJECTIVE    Pulse 83   Ht 167.6 cm (66\")   Wt 118 kg (260 lb)   SpO2 97%   BMI 41.97 kg/m²       Review of Systems   Constitutional: Negative.    HENT: Negative.    Eyes: Negative.    Respiratory: Negative.    Cardiovascular: Negative. "    Gastrointestinal: Negative.    Endocrine: Negative.    Genitourinary: Negative.    Musculoskeletal:        Foot pain.    Skin: Negative.    Allergic/Immunologic: Negative.    Neurological: Negative.    Hematological: Negative.    Psychiatric/Behavioral: Negative.  Negative for agitation.         Physical Exam   Constitutional: she appears well-developed and well-nourished.   HEENT: Normocephalic. Atraumatic.  CV: No CP. RRR  Resp: Non-labored respirations.  Psychiatric: she has a normal mood and affect. her behavior is normal.         Lower Extremity Exam:  Vascular: DP/PT pulses palpable 2+.   No edema, right foot  Foot warm  CFT wnl  Neuro: Protective sensation intact, b/l.  DTRs intact  Negative Tinel over dorsal midfoot, b/l.  Integument: No open wounds or lesions.  No erythema, scaling  No masses  Musculoskeletal: LE muscle strength 5/5.   Gait normal  Ankle ROM full without pain or crepitus. Minimal gastrocnemius Equinus  STJ ROM full without pain or crepitus  Tenderness palpation plantar calcaneal tubercles, central plantar fascial band on right  No gross instability or crepitus  Mild pes planus        ASSESSMENT AND PLAN    Diagnoses and all orders for this visit:    1. Right foot pain (Primary)  -     XR Foot 3+ View Right    2. Plantar fasciitis    Other orders  -     methylPREDNISolone (MEDROL) 4 MG dose pack; Take as directed on package.  **Take with food**  Dispense: 21 tablet; Refill: 0  -     nabumetone (Relafen) 500 MG tablet; Take 1 tablet by mouth 2 (Two) Times a Day.  **Take with food**  Dispense: 60 tablet; Refill: 0      -Comprehensive foot and ankle exam performed  -Radiographs ordered and reviewed  -Educated pt on diagnosis, etiology and treatment of plantar fasciitis.  -Continue motion control shoe, OTC insoles  -Aggressive stretching regimen, patient education materials dispensed.  Recommend night splint for improved stretch.  -Rx Medrol Dosepak, followed by Relafen 500 mg twice daily not  to be taken with other NSAIDs  -Corticosteroid injection in the future if necessary.  She deferred today due to work.  -Recheck 4 weeks if symptoms not improved          This document has been electronically signed by Blue Del Rosario DPM on July 23, 2022 08:32 CDT       Much of this encounter note is an electronic transcription/translation of spoken language to printed text.   Blue Del Rosario DPM  7/23/2022  08:32 CDT

## 2022-08-22 ENCOUNTER — OFFICE VISIT (OUTPATIENT)
Dept: PODIATRY | Facility: CLINIC | Age: 52
End: 2022-08-22

## 2022-08-22 VITALS — OXYGEN SATURATION: 99 % | HEIGHT: 66 IN | HEART RATE: 84 BPM | BODY MASS INDEX: 41.78 KG/M2 | WEIGHT: 260 LBS

## 2022-08-22 DIAGNOSIS — M72.2 PLANTAR FASCIITIS: Primary | ICD-10-CM

## 2022-08-22 DIAGNOSIS — M79.671 RIGHT FOOT PAIN: ICD-10-CM

## 2022-08-22 PROCEDURE — 20550 NJX 1 TENDON SHEATH/LIGAMENT: CPT | Performed by: PODIATRIST

## 2022-08-22 RX ORDER — NABUMETONE 500 MG/1
500 TABLET, FILM COATED ORAL 2 TIMES DAILY
Qty: 60 TABLET | Refills: 0 | Status: SHIPPED | OUTPATIENT
Start: 2022-08-22 | End: 2023-03-24

## 2022-08-22 RX ORDER — BETAMETHASONE SODIUM PHOSPHATE AND BETAMETHASONE ACETATE 3; 3 MG/ML; MG/ML
6 INJECTION, SUSPENSION INTRA-ARTICULAR; INTRALESIONAL; INTRAMUSCULAR; SOFT TISSUE ONCE
Status: COMPLETED | OUTPATIENT
Start: 2022-08-22 | End: 2022-08-22

## 2022-08-22 RX ADMIN — BETAMETHASONE SODIUM PHOSPHATE AND BETAMETHASONE ACETATE 6 MG: 3; 3 INJECTION, SUSPENSION INTRA-ARTICULAR; INTRALESIONAL; INTRAMUSCULAR; SOFT TISSUE at 13:40

## 2022-08-22 NOTE — PROGRESS NOTES
"Jaja Land  1970  52 y.o. female     Patient presents to clinic today for pain in the right foot.     08/22/2022      Chief Complaint   Patient presents with   • Right Foot - Follow-up, Pain           History of Present Illness    Jaja Land is a 52 y.o. female who presents for follow-up of right heel pain minimally improved with stretching, Medrol Dosepak and NSAIDs.    Past Medical History:   Diagnosis Date   • Dysthymia 5/8/2018   • Elevated systolic blood pressure reading without diagnosis of hypertension 5/8/2018   • Fibroid    • Menopausal syndrome 5/8/2018   • Morbid obesity with BMI of 40.0-44.9, adult ([facility]) 5/8/2018   • Plantar fasciitis          Past Surgical History:   Procedure Laterality Date   • HYSTERECTOMY     • WISDOM TOOTH EXTRACTION           Family History   Problem Relation Age of Onset   • Brain cancer Mother    • Cancer Mother    • Thyroid disease Mother    • Thyroid disease Sister          Social History     Socioeconomic History   • Marital status:    Tobacco Use   • Smoking status: Former Smoker   • Smokeless tobacco: Never Used   Vaping Use   • Vaping Use: Never used   Substance and Sexual Activity   • Alcohol use: No   • Drug use: No         Current Outpatient Medications   Medication Sig Dispense Refill   • nabumetone (Relafen) 500 MG tablet Take 1 tablet by mouth 2 (Two) Times a Day.  **Take with food** 60 tablet 0     Current Facility-Administered Medications   Medication Dose Route Frequency Provider Last Rate Last Admin   • betamethasone acetate-betamethasone sodium phosphate (CELESTONE SOLUSPAN) injection 6 mg  6 mg Intramuscular Once Blue Del Rosario, DPM             OBJECTIVE    Pulse 84   Ht 167.6 cm (66\")   Wt 118 kg (260 lb)   SpO2 99%   BMI 41.97 kg/m²       Review of Systems   Constitutional: Negative.    HENT: Negative.    Eyes: Negative.    Respiratory: Negative.    Cardiovascular: Negative.    Gastrointestinal: Negative.    Endocrine: Negative.  "   Genitourinary: Negative.    Musculoskeletal:        Foot pain.    Skin: Negative.    Allergic/Immunologic: Negative.    Neurological: Negative.    Hematological: Negative.    Psychiatric/Behavioral: Negative.  Negative for agitation.         Physical Exam   Constitutional: she appears well-developed and well-nourished.   HEENT: Normocephalic. Atraumatic.  CV: No CP. RRR  Resp: Non-labored respirations.  Psychiatric: she has a normal mood and affect. her behavior is normal.         Lower Extremity Exam:  Vascular: DP/PT pulses palpable 2+.   No edema, right foot  Foot warm  CFT wnl  Neuro: Protective sensation intact, b/l.  DTRs intact  Negative Tinel over dorsal midfoot, b/l.  Integument: No open wounds or lesions.  No erythema, scaling  No masses  Musculoskeletal: LE muscle strength 5/5.   Gait normal  Ankle ROM full without pain or crepitus. Minimal gastrocnemius Equinus  STJ ROM full without pain or crepitus  Tenderness palpation plantar calcaneal tubercles, central plantar fascial band on right  No gross instability or crepitus  Mild pes planus    Right heel injection:  Risks and benefits discussed. Written consent obtained.  Skin prepped with alcohol  Medial heel injection 1cc 0.5% Marcaine, 1cc dexamethasone phosphate, 0.5 cc Kenalog 40 with 27g needle  Band-aid applied.  Pt tolerated well.      ASSESSMENT AND PLAN    Diagnoses and all orders for this visit:    1. Plantar fasciitis (Primary)  -     betamethasone acetate-betamethasone sodium phosphate (CELESTONE SOLUSPAN) injection 6 mg    2. Right foot pain      -Comprehensive foot and ankle exam performed  -Radiographs ordered and reviewed  -Educated pt on diagnosis, etiology and treatment of plantar fasciitis.  -Continue motion control shoe, OTC insoles  -Continue stretching regimen  -Corticosteroid injection as above  -Recheck 4 weeks if symptoms not improved          This document has been electronically signed by Blue Del Rosario DPM on August 22,  2022 14:38 CDT       Much of this encounter note is an electronic transcription/translation of spoken language to printed text.   Blue Del Rosario DPM  8/22/2022  14:38 CDT

## 2023-01-23 ENCOUNTER — LAB (OUTPATIENT)
Dept: LAB | Facility: HOSPITAL | Age: 53
End: 2023-01-23
Payer: COMMERCIAL

## 2023-01-23 ENCOUNTER — TRANSCRIBE ORDERS (OUTPATIENT)
Dept: LAB | Facility: HOSPITAL | Age: 53
End: 2023-01-23
Payer: COMMERCIAL

## 2023-01-23 DIAGNOSIS — M84.374A STRESS FRACTURE OF RIGHT FOOT, INITIAL ENCOUNTER: Primary | ICD-10-CM

## 2023-01-23 DIAGNOSIS — M84.374A STRESS FRACTURE OF RIGHT FOOT, INITIAL ENCOUNTER: ICD-10-CM

## 2023-01-23 PROCEDURE — 81001 URINALYSIS AUTO W/SCOPE: CPT

## 2023-01-23 PROCEDURE — 82607 VITAMIN B-12: CPT

## 2023-01-23 PROCEDURE — 84439 ASSAY OF FREE THYROXINE: CPT

## 2023-01-23 PROCEDURE — 80061 LIPID PANEL: CPT

## 2023-01-23 PROCEDURE — 85007 BL SMEAR W/DIFF WBC COUNT: CPT

## 2023-01-23 PROCEDURE — 36415 COLL VENOUS BLD VENIPUNCTURE: CPT

## 2023-01-23 PROCEDURE — 83036 HEMOGLOBIN GLYCOSYLATED A1C: CPT

## 2023-01-23 PROCEDURE — 80050 GENERAL HEALTH PANEL: CPT

## 2023-01-24 LAB
ALBUMIN SERPL-MCNC: 4.4 G/DL (ref 3.5–5.2)
ALBUMIN/GLOB SERPL: 1.5 G/DL
ALP SERPL-CCNC: 112 U/L (ref 39–117)
ALT SERPL W P-5'-P-CCNC: 16 U/L (ref 1–33)
ANION GAP SERPL CALCULATED.3IONS-SCNC: 8.5 MMOL/L (ref 5–15)
ANISOCYTOSIS BLD QL: ABNORMAL
AST SERPL-CCNC: 19 U/L (ref 1–32)
BACTERIA UR QL AUTO: NORMAL /HPF
BILIRUB SERPL-MCNC: 0.4 MG/DL (ref 0–1.2)
BILIRUB UR QL STRIP: NEGATIVE
BUN SERPL-MCNC: 16 MG/DL (ref 6–20)
BUN/CREAT SERPL: 21.1 (ref 7–25)
CALCIUM SPEC-SCNC: 9.8 MG/DL (ref 8.6–10.5)
CHLORIDE SERPL-SCNC: 103 MMOL/L (ref 98–107)
CHOLEST SERPL-MCNC: 153 MG/DL (ref 0–200)
CLARITY UR: ABNORMAL
CO2 SERPL-SCNC: 27.5 MMOL/L (ref 22–29)
COLOR UR: YELLOW
CREAT SERPL-MCNC: 0.76 MG/DL (ref 0.57–1)
DEPRECATED RDW RBC AUTO: 42 FL (ref 37–54)
EGFRCR SERPLBLD CKD-EPI 2021: 93.8 ML/MIN/1.73
EOSINOPHIL # BLD MANUAL: 0.17 10*3/MM3 (ref 0–0.4)
EOSINOPHIL NFR BLD MANUAL: 2.2 % (ref 0.3–6.2)
ERYTHROCYTE [DISTWIDTH] IN BLOOD BY AUTOMATED COUNT: 12.9 % (ref 12.3–15.4)
GLOBULIN UR ELPH-MCNC: 3 GM/DL
GLUCOSE SERPL-MCNC: 102 MG/DL (ref 65–99)
GLUCOSE UR STRIP-MCNC: NEGATIVE MG/DL
HBA1C MFR BLD: 5.7 % (ref 4.8–5.6)
HCT VFR BLD AUTO: 43.5 % (ref 34–46.6)
HDLC SERPL-MCNC: 48 MG/DL (ref 40–60)
HGB BLD-MCNC: 14 G/DL (ref 12–15.9)
HGB UR QL STRIP.AUTO: NEGATIVE
HYALINE CASTS UR QL AUTO: NORMAL /LPF
KETONES UR QL STRIP: ABNORMAL
LDLC SERPL CALC-MCNC: 89 MG/DL (ref 0–100)
LDLC/HDLC SERPL: 1.85 {RATIO}
LEUKOCYTE ESTERASE UR QL STRIP.AUTO: ABNORMAL
LYMPHOCYTES # BLD MANUAL: 2.44 10*3/MM3 (ref 0.7–3.1)
LYMPHOCYTES NFR BLD MANUAL: 14.4 % (ref 5–12)
MCH RBC QN AUTO: 28.8 PG (ref 26.6–33)
MCHC RBC AUTO-ENTMCNC: 32.2 G/DL (ref 31.5–35.7)
MCV RBC AUTO: 89.5 FL (ref 79–97)
MICROCYTES BLD QL: ABNORMAL
MONOCYTES # BLD: 1.13 10*3/MM3 (ref 0.1–0.9)
NEUTROPHILS # BLD AUTO: 4.09 10*3/MM3 (ref 1.7–7)
NEUTROPHILS NFR BLD MANUAL: 52.2 % (ref 42.7–76)
NITRITE UR QL STRIP: NEGATIVE
NRBC SPEC MANUAL: 1.1 /100 WBC (ref 0–0.2)
PH UR STRIP.AUTO: 5.5 [PH] (ref 5–8)
PLAT MORPH BLD: NORMAL
PLATELET # BLD AUTO: 289 10*3/MM3 (ref 140–450)
PMV BLD AUTO: 11.2 FL (ref 6–12)
POTASSIUM SERPL-SCNC: 4.2 MMOL/L (ref 3.5–5.2)
PROT SERPL-MCNC: 7.4 G/DL (ref 6–8.5)
PROT UR QL STRIP: NEGATIVE
RBC # BLD AUTO: 4.86 10*6/MM3 (ref 3.77–5.28)
RBC # UR STRIP: NORMAL /HPF
REF LAB TEST METHOD: NORMAL
SODIUM SERPL-SCNC: 139 MMOL/L (ref 136–145)
SP GR UR STRIP: 1.02 (ref 1–1.03)
SQUAMOUS #/AREA URNS HPF: NORMAL /HPF
T4 FREE SERPL-MCNC: 1.24 NG/DL (ref 0.93–1.7)
TRIGL SERPL-MCNC: 81 MG/DL (ref 0–150)
TSH SERPL DL<=0.05 MIU/L-ACNC: 1.97 UIU/ML (ref 0.27–4.2)
UROBILINOGEN UR QL STRIP: ABNORMAL
VARIANT LYMPHS NFR BLD MANUAL: 31.1 % (ref 19.6–45.3)
VIT B12 BLD-MCNC: 612 PG/ML (ref 211–946)
VLDLC SERPL-MCNC: 16 MG/DL (ref 5–40)
WBC # UR STRIP: NORMAL /HPF
WBC MORPH BLD: NORMAL
WBC NRBC COR # BLD: 7.84 10*3/MM3 (ref 3.4–10.8)